# Patient Record
Sex: FEMALE
[De-identification: names, ages, dates, MRNs, and addresses within clinical notes are randomized per-mention and may not be internally consistent; named-entity substitution may affect disease eponyms.]

---

## 2020-03-20 ENCOUNTER — HOSPITAL ENCOUNTER (OUTPATIENT)
Dept: HOSPITAL 90 - SHCH | Age: 70
Discharge: HOME | End: 2020-03-20
Attending: INTERNAL MEDICINE
Payer: COMMERCIAL

## 2020-03-20 DIAGNOSIS — I10: Primary | ICD-10-CM

## 2020-03-20 DIAGNOSIS — R07.9: ICD-10-CM

## 2020-03-20 PROCEDURE — 78452 HT MUSCLE IMAGE SPECT MULT: CPT

## 2020-03-20 PROCEDURE — 93017 CV STRESS TEST TRACING ONLY: CPT

## 2020-03-20 PROCEDURE — 96374 THER/PROPH/DIAG INJ IV PUSH: CPT

## 2025-01-29 ENCOUNTER — HOSPITAL ENCOUNTER (OUTPATIENT)
Dept: HOSPITAL 90 - RAH | Age: 75
Discharge: HOME | End: 2025-01-29
Attending: INTERNAL MEDICINE
Payer: COMMERCIAL

## 2025-01-29 DIAGNOSIS — R07.9: Primary | ICD-10-CM

## 2025-01-29 PROCEDURE — 75574 CT ANGIO HRT W/3D IMAGE: CPT

## 2025-01-29 NOTE — [facility]
CT CARDIAC ANGIO W/CONT. CCTA



REASON: CHEST PAIN



COMPARISON: None 



TECHNIQUE: Images are obtained through the heart in the axial plane

before and during bolus IV contrast infusion, 100 cc Omnipaque 350. 2-D

and 3-D multiplanar reconstruction images were then performed. The

injection had to be repeated once due to motion artifact on the first

sequence, total contrast volume was 200 cc.



FINDINGS: 



This dictation is for the noncardiac findings only. Cardiac and

coronary artery findings are reported separately.



Visualized portions of the lungs are clear. There is normal-appearing

pulmonary interstitium. There is no hilar or mediastinal

lymphadenopathy. Chest wall structures appear unremarkable. 



IMPRESSION: 

 

1. Unremarkable noncardiac portions of CT cardiac angiography.

## 2025-02-14 ENCOUNTER — HOSPITAL ENCOUNTER (EMERGENCY)
Dept: HOSPITAL 90 - EDH | Age: 75
Discharge: HOME | End: 2025-02-14
Payer: COMMERCIAL

## 2025-02-14 VITALS
RESPIRATION RATE: 16 BRPM | SYSTOLIC BLOOD PRESSURE: 116 MMHG | DIASTOLIC BLOOD PRESSURE: 59 MMHG | HEART RATE: 60 BPM | OXYGEN SATURATION: 98 % | TEMPERATURE: 98.2 F

## 2025-02-14 VITALS — WEIGHT: 177.03 LBS | HEIGHT: 60 IN | BODY MASS INDEX: 34.76 KG/M2

## 2025-02-14 DIAGNOSIS — I10: ICD-10-CM

## 2025-02-14 DIAGNOSIS — E78.00: ICD-10-CM

## 2025-02-14 DIAGNOSIS — E11.9: ICD-10-CM

## 2025-02-14 DIAGNOSIS — I48.91: Primary | ICD-10-CM

## 2025-02-14 LAB
BASOPHILS # BLD AUTO: 0.02 K/UL (ref 0–0.2)
BASOPHILS NFR BLD AUTO: 0.4 % (ref 0–5)
BUN SERPL-MCNC: 13 MG/DL (ref 7–18)
CHLORIDE SERPL-SCNC: 103 MMOL/L (ref 101–111)
CO2 SERPL-SCNC: 32 MMOL/L (ref 21–32)
CREAT SERPL-MCNC: 0.8 MG/DL (ref 0.5–1)
EOSINOPHIL # BLD AUTO: 0.06 K/UL (ref 0–0.7)
EOSINOPHIL NFR BLD AUTO: 1.1 % (ref 0–8)
ERYTHROCYTE [DISTWIDTH] IN BLOOD BY AUTOMATED COUNT: 12.8 % (ref 11–15.5)
GFR SERPL CREATININE-BSD FRML MDRD: 76 ML/MIN (ref 90–?)
GLUCOSE SERPL-MCNC: 120 MG/DL (ref 70–105)
HCT VFR BLD AUTO: 41.9 % (ref 36–48)
IMM GRANULOCYTES # BLD: 0.02 K/UL (ref 0–1)
LYMPHOCYTES # SPEC AUTO: 1.9 K/UL (ref 1–4.8)
LYMPHOCYTES NFR SPEC AUTO: 34.2 % (ref 21–51)
MCH RBC QN AUTO: 30.4 PG (ref 27–33)
MCHC RBC AUTO-ENTMCNC: 32.7 G/DL (ref 32–36)
MCV RBC AUTO: 93.1 FL (ref 79–99)
MONOCYTES # BLD AUTO: 0.4 K/UL (ref 0.1–1)
MONOCYTES NFR BLD AUTO: 7 % (ref 3–13)
NEUTROPHILS # BLD AUTO: 3.2 K/UL (ref 1.8–7.7)
NEUTROPHILS NFR BLD AUTO: 56.9 % (ref 40–77)
NRBC BLD MANUAL-RTO: 0 % (ref 0–0.19)
PLATELET # BLD AUTO: 265 K/UL (ref 130–400)
POTASSIUM SERPL-SCNC: 4 MMOL/L (ref 3.5–5.1)
RBC # BLD AUTO: 4.5 MIL/UL (ref 4–5.5)
SODIUM SERPL-SCNC: 142 MMOL/L (ref 136–145)
WBC # BLD AUTO: 5.6 K/UL (ref 4.8–10.8)

## 2025-02-14 PROCEDURE — 93005 ELECTROCARDIOGRAM TRACING: CPT

## 2025-02-14 PROCEDURE — 99284 EMERGENCY DEPT VISIT MOD MDM: CPT

## 2025-02-14 PROCEDURE — 85025 COMPLETE CBC W/AUTO DIFF WBC: CPT

## 2025-02-14 PROCEDURE — 84484 ASSAY OF TROPONIN QUANT: CPT

## 2025-02-14 PROCEDURE — 80048 BASIC METABOLIC PNL TOTAL CA: CPT

## 2025-02-14 PROCEDURE — 36415 COLL VENOUS BLD VENIPUNCTURE: CPT

## 2025-02-14 NOTE — ERN
ED Note


History of Present Illness


Stated Complaint:  ABNORMAL RHYTHM


Chief Complaint:  Palpitations


Time Seen by MD:  10:30


Time Seen by Midlevel:  10:45


Dictation:


74-year-old female with a history of hypertension, diabetes, cholesterol, AFib 

coming in for evaluation after an abnormal EKG at the doctor's office two days 

ago.  Patient states she went for her regular checkup by Dr. Vera office was 

called yesterday to come back in to evaluate her heart rate which was in the 

130s.  At this time patient denies any complaints, denies any recent illness.


Allergies:  


Coded Allergies:  


     No Known Allergies (Unverified  Allergy, Unknown, 3/19/20)





Review of System


Dictation


Constitutional: Negative for fever,chills, and weight loss


Eyes: Negative for injury, pain,redness, and discharge


ENT: Negative for injury,pain or swelling


Cardiovascular: Negative for chest pain, palpitations, and edema


Respiratory: Negative for shortness of breath, cough, and wheezing, 


Abdomen/GI: Negative for abdominal pain, nausea, vomiting, diarrhea, and 

constipation


Back: Negative for injury and pain


: Negative for injury, bleeding and discharge


MS/Extremity: Negative for injury and deformity


Skin: Negative for rash, and discoloration


Neuro: Negative for headache, weakness, numbness, tingling, and seizure 


Psych: Negative for suicide ideation, homicidal ideation, and hallucinations


Review of Systems:   was completed





Initial Vital Sign


VS





                                   Vital Signs








  Date Time  Temp Pulse Resp B/P (MAP) Pulse Ox O2 Delivery O2 Flow Rate FiO2


 


2/14/25 10:41 98.2 70 16 149/86 98 Room Air 0 


 


2/14/25 10:50        21











Physical Exam


Dictation


General: awake, alert, NAD


Head/Face: Normocephalic, atraumatic


Eyes: PERRL, EOMI, vision at baseline


ENT: oral cavity clear, TMs clear, no signs of infection


Neck: Trachea midline, supple, no nuchal rigidity


Cardiovascular: RRR, normal S1/S2, No MRGs, no JVD


Respiratory: CTAB, no respiratory distress, No rales or wheezes


Abdomen: Soft, non-tender, non-distended, normal bowel sounds, no guarding or 

rebound.


Skin: Warm, dry, normal turgor, no rash


MS/Extremity: Pulses equal, no cyanosis, neurovascular intact, FROM


Neuro: COAx4, GCS 15, strength 5/5, CN 2-12 intact, normal cerebellar exam, 

normal gait,


Psych: Normal behavior, mood, and affect normal





Results (Laboratory/Radiology)


Laboratory/Radiology





Laboratory Tests








Test


 2/14/25


10:58


 


White Blood Count


 5.6 K/uL


(4.8-10.8)


 


Red Blood Count


 4.50 MIL/uL


(4.00-5.50)


 


Hemoglobin


 13.7 g/dL


(12.0-16.0)


 


Hematocrit


 41.9 % (36-48)





 


Mean Corpuscular Volume


 93.1 fL


(79-99)


 


Mean Corpuscular Hemoglobin


 30.4 pg


(27.0-33.0)


 


Mean Corpuscular Hemoglobin


Concent 32.7 g/dL


(32.0-36.0)


 


Red Cell Distribution Width


 12.8 %


(11.0-15.5)


 


Platelet Count


 265 K/uL


(130-400)


 


Mean Platelet Volume


 10.1 fL


(7.5-10.5)


 


Immature Granulocyte % (Auto) 0.4 % (0-1)  


 


Neutrophils (%) (Auto)


 56.9 %


(40.0-77.0)


 


Lymphocytes (%) (Auto)


 34.2 %


(21.0-51.0)


 


Monocytes (%) (Auto)


 7.0 %


(3.0-13.0)


 


Eosinophils (%) (Auto)


 1.1 %


(0.0-8.0)


 


Basophils (%) (Auto)


 0.4 %


(0.0-5.0)


 


Neutrophils # (Auto)


 3.2 K/uL


(1.8-7.7)


 


Lymphocytes # (Auto)


 1.9 K/uL


(1.0-4.8)


 


Monocytes # (Auto)


 0.4 K/uL


(0.1-1.0)


 


Eosinophils # (Auto)


 0.06 K/uL


(0.00-0.70)


 


Basophils # (Auto)


 0.02 K/uL


(0.00-0.20)


 


Absolute Immature Granulocyte


(auto 0.02 K/uL


(0-1)


 


Nucleated Red Blood Cells


 0.0 %


(0.0-0.19)


 


Sodium Level


 142 mmol/L


(136-145)


 


Potassium Level


 4.0 mmol/L


(3.5-5.1)


 


Chloride Level


 103 mmol/L


(101-111)


 


Carbon Dioxide Level


 32 mmol/L


(21-32)


 


Blood Urea Nitrogen


 13 mg/dL


(7-18)


 


Creatinine


 0.8 mg/dL


(0.5-1.0)


 


Glomerular Filtration Rate


Calc 76 mL/min


(>90)


 


Random Glucose


 120 mg/dL


()  H


 


Total Calcium


 8.9 mg/dL


(8.5-10.1)


 


Troponin I High Sensitivity 6 ng/L (4-50)  








Labs Reviewed?:  Yes


EKG Comment:


Date:02/14/25


Time:1050


Ventricular rate:73


GA interval:


QRS duration:25


QT/QTc:419/462


EKG interpretation:  Atrial fibrillation


Reviewed by ED Attending no STEMI interpreted by ER MD





ED Course


ED Course





Orders








Procedure Category Date Status





  Time 


 


12 Lead Ekg Tracing- EKG 2/14/25 Logged





Technical  10:29 


 


Cbc With Differential LAB 2/14/25 Complete





  10:34 


 


Basic Metabolic Panel LAB 2/14/25 Complete





  10:34 


 


Troponin I High LAB 2/14/25 Complete





Sensitivity  10:34 


 


12 Lead Ekg Tracing- EKG 2/14/25 Logged





Technical  10:34 








Vital Signs








  Date Time  Temp Pulse Resp B/P (MAP) Pulse Ox O2 Delivery O2 Flow Rate FiO2


 


2/14/25 10:50 98.2 70 16 149/86 98 Room Air* 0 21


 


2/14/25 10:41 98.2 70 16 149/86 98 Room Air 0 











Medical Decision Making


MDM


MDM: 74-year-old female with a history of hypertension, diabetes, cholesterol, 

AFib coming in for evaluation after an abnormal EKG at the doctor's office two 

days ago.  Patient states [facility] to come back in to evaluate her heart rate which was 

in the 130s.  At this time patient denies any complaints, denies any recent 

illness.  CBC unremarkable, chemistry unremarkable.  Troponin is negative.  EKGs

shows AFib at a regular rate.  Discussed findings with the patient, educated 

patient she needs to follow up with Dr. Meyers and or return here to the ER if

she develops any symptoms.  Patient verbalized understanding, answered all 

questions.


Differential diagnosis:  AFib with RVR, ACS, electrolyte abnormality, 

dehydration


Rationale: Tests considered and ordered secondary to shared decision making in

clude:


Previous outside records reviewed: Old ER visits.


Risk of complication and/or morbidity or mortality of patient management: None


Medications-Per medication reconciliation


Need for hospitalization: Patient does not meet criteria for hospitalization.


Need for emergency major/minor surgery: No





There are no social concerns with this patient.





Prescription drug management


Prescriptions will include symptomatic care





Patient's prior external medical records from other ER visits were reviewed by 

me as indicated.  Prior testing and results from previous visits were reviewed. 

Prior tests were taken into account with medical decision making and resource 

utilization, independent historian/historians were used to obtain complete 

medical history.





I independently interpreted the test that were performed, results were reviewed 

by me and considered findings on radiology if ordered.





Medical management and examination interpretation discussions were had by me 

with other qualified healthcare professionals as indicated for the patient's 

care.





DX & DISP


Disposition:  Discharge


Departure


Impression:  


   Primary Impression:  A-fib


Condition:  Stable





Additional Instructions:  


If you develop any symptoms like chest pain, palpitations, lightheadedness or 

dizziness please return back to the ER.  Otherwise follow up with the 

cardiologist in 1-2 days.


Referrals:  


ALAN HUGHES MD (PCP)


Time of Disposition:  12:16


I have reviewed the case, and I agree with, Diagnosis and Plan











ANNY BARGER NP             Feb 14, 2025 10:36

## 2025-02-14 NOTE — EKG
[facility]

                                       

Test Date:    2025               Test Time:    10:50:23

Pat Name:     JOHNNY AVENDAÑO   Department:   [facility]

Patient ID:   [id number]           Room:          

Gender:       F                        Technician:   [id number]

:          1950               Requested By: ANNY BARGER

Order Number: [id number]        Reading MD:   Wade Dunbar

                                 Measurements

Intervals                              Axis          

Rate:         73                       P:            0

NM:           0                        QRS:          25

QRSD:         90                       T:            5

QT:           419                                    

QTc:          462                                    

                           Interpretive Statements

Atrial fibrillation

No previous ECG available for comparison

Electronically Signed On 2025 16:15:24 CST by Wade Dunbar



Please click the below link to view image of tracing.